# Patient Record
Sex: MALE | Race: OTHER | NOT HISPANIC OR LATINO | URBAN - METROPOLITAN AREA
[De-identification: names, ages, dates, MRNs, and addresses within clinical notes are randomized per-mention and may not be internally consistent; named-entity substitution may affect disease eponyms.]

---

## 2018-01-30 ENCOUNTER — OUTPATIENT (OUTPATIENT)
Dept: OUTPATIENT SERVICES | Facility: HOSPITAL | Age: 58
LOS: 1 days | Discharge: HOME | End: 2018-01-30

## 2018-01-30 DIAGNOSIS — I20.9 ANGINA PECTORIS, UNSPECIFIED: ICD-10-CM

## 2018-05-18 ENCOUNTER — OUTPATIENT (OUTPATIENT)
Dept: OUTPATIENT SERVICES | Facility: HOSPITAL | Age: 58
LOS: 1 days | Discharge: HOME | End: 2018-05-18

## 2018-05-18 DIAGNOSIS — M79.671 PAIN IN RIGHT FOOT: ICD-10-CM

## 2018-05-18 DIAGNOSIS — E11.49 TYPE 2 DIABETES MELLITUS WITH OTHER DIABETIC NEUROLOGICAL COMPLICATION: ICD-10-CM

## 2018-05-18 DIAGNOSIS — M79.672 PAIN IN LEFT FOOT: ICD-10-CM

## 2019-01-08 ENCOUNTER — TRANSCRIPTION ENCOUNTER (OUTPATIENT)
Age: 59
End: 2019-01-08

## 2019-01-08 ENCOUNTER — OUTPATIENT (OUTPATIENT)
Dept: OUTPATIENT SERVICES | Facility: HOSPITAL | Age: 59
LOS: 1 days | Discharge: HOME | End: 2019-01-08

## 2019-01-08 VITALS — RESPIRATION RATE: 19 BRPM | SYSTOLIC BLOOD PRESSURE: 136 MMHG | HEART RATE: 78 BPM | DIASTOLIC BLOOD PRESSURE: 86 MMHG

## 2019-01-08 VITALS
TEMPERATURE: 98 F | HEIGHT: 66 IN | SYSTOLIC BLOOD PRESSURE: 140 MMHG | RESPIRATION RATE: 18 BRPM | DIASTOLIC BLOOD PRESSURE: 91 MMHG | WEIGHT: 177.91 LBS | HEART RATE: 79 BPM

## 2019-01-08 RX ORDER — LOSARTAN POTASSIUM 100 MG/1
0 TABLET, FILM COATED ORAL
Qty: 0 | Refills: 0 | COMMUNITY

## 2019-01-08 RX ORDER — ASPIRIN/CALCIUM CARB/MAGNESIUM 324 MG
1 TABLET ORAL
Qty: 0 | Refills: 0 | COMMUNITY

## 2019-01-08 RX ORDER — METFORMIN HYDROCHLORIDE 850 MG/1
1000 TABLET ORAL
Qty: 0 | Refills: 0 | COMMUNITY

## 2019-01-08 RX ORDER — GLIMEPIRIDE 1 MG
1 TABLET ORAL
Qty: 0 | Refills: 0 | COMMUNITY

## 2019-01-08 RX ORDER — OMEGA-3 ACID ETHYL ESTERS 1 G
1000 CAPSULE ORAL
Qty: 0 | Refills: 0 | COMMUNITY

## 2019-01-08 NOTE — H&P PST ADULT - ASSESSMENT
57 yo M with PMH mentioned here for EGD and EUS for Esophageal submucosal Lesion  Risks and benefits discussed with the patient.  Will proceed with the scheduled case.

## 2019-01-08 NOTE — ASU DISCHARGE PLAN (ADULT/PEDIATRIC). - NOTIFY
Bleeding that does not stop/Inability to Tolerate Liquids or Foods/Persistent Nausea and Vomiting/Unable to Urinate/Pain not relieved by Medications

## 2019-01-08 NOTE — CHART NOTE - NSCHARTNOTEFT_GEN_A_CORE
PACU ANESTHESIA ADMISSION NOTE      Procedure:   Post op diagnosis:      ____  Intubated  TV:______       Rate: ______      FiO2: ______    _x___  Patent Airway    _x___  Full return of protective reflexes    _x___  Full recovery from anesthesia / back to baseline status    Vitals:            T:                BP :  142/69   R: 18  Sat:  100     P:100      Mental Status:  _x___ Awake   _____ Alert   _____ Drowsy   _____ Sedated    Nausea/Vomiting:  _x___  NO       ______Yes,   See Post - Op Orders         Pain Scale (0-10):  __0___    Treatment: _x___ None    ____ See Post - Op/PCA Orders    Post - Operative Fluids:   __x__ Oral   ____ See Post - Op Orders    Plan: Discharge:   _x___Home       _____Floor     _____Critical Care    _____  Other:_________________    Comments:  No anesthesia issues or complications noted.  Discharge when criteria met.

## 2019-01-08 NOTE — ASU DISCHARGE PLAN (ADULT/PEDIATRIC). - B. DRINK ALCOHOL, BEER, OR WINE
DIABETES PROGRESS REPORT-DIABETES SELF MANAGEMENT EDUCATION, INDIVIDUAL SESSION   Date: 12/27/2018     ASSESSMENT:    The patient was seen from 1300 to 1400.  The patient was seen for diabetes education in  an individual setting following a diagnosis of type 2 diabetes.     The barriers to self-care and learning limitations were assessed as: Cognitive , Psychological and / or emotional  and Financial limitations  The patient's preference for learning:  visual/verbal and observation, reading  and repetition  Readiness to learn: The patient demonstrates an ability to understand yes    Asking questions  yes     COMMENTS:   Eren was seen today for an initial individual diabetes self-management appointment.  Eren was alone at the visit today.  Eren was diagnosed with Type 2 diabetes in November 2018 following a CVA.  Prior to this Eren was in the pre-diabetes range for blood sugar levels.  Eren is on Metformin for his diabetes management.  Eren tests 1 time daily but has been testing 3 times daily for the past week as PCP wished to see more blood sugar levels through out the day.      During the visit today we reviewed the basic pathophysiology of diabetes.  We reviewed the role of metformin in reducing blood sugar readings along with its mechanism of action.  We reviewed target blood sugar levels for both fasting and 2 hours post prandial.  PCP would like Eren to have FBS levels of <120 mg/dL and 2 hour post prandial level of <140 mg/dL.  These parameters along with target A1C <7% were discussed.  We reviewed the different food groups along with the importance of eating 3 times daily, spaced 4-5 hours apart, at consistent times, without skipping meals and to have small snacks if needed in between main eating times.  Eren is already eating at consistent times per day with small snacks.   We reviewed the plate method for healthy eating an portion control.      After today’s session, patient will be able to:   Describe  what diabetes is and how it affects the body.   Verbalize his target glucose range.   Verbalize the understanding of the medications he/she is using to  manage diabetes.   Vebalizes understanding of food groups and plate method.    Patient verbalized no further questions at the end of appointment.      Current Diabetes Medications: Metformin 1000 mg BID    Date Fasting 2 Hr. Post Lunch 2 Hr. Post Supper 2 hr. Post Bedtime   12-27 109         12-26 111   115  134    12-25 101   116  119    12-24 111   97  96    12-23 113   97  119    12-22 110   94  109    12-21 103   93  113    12-20 129   94  101    12-19 110         12-18 109         12-17 115         12-16 109                       Patient did bring logbook/meter.    Hemoglobin A1C (%)   Date Value   11/27/2018 9.2 (H)     CHOLESTEROL (mg/dL)   Date Value   11/28/2018 194      HDL (mg/dL)   Date Value   11/28/2018 37 (L)    No components found for: LDL   TRIGLYCERIDE (mg/dL)   Date Value   11/28/2018 143      CALCULATED LDL (mg/dL)   Date Value   11/28/2018 128      Creatinine (mg/dL)   Date Value   11/27/2018 0.90          EDUCATION PROVIDED ON THE FOLLOWING DIABETES TOPICS:    The patient's identified learning needs include: diabetes disease process/treatment options, medical nutrition therapy, physical activity, medications, monitoring, acute complications, chronic complications, behavior change strategies and psychosocial adjustment  Today's visit included education/training on the following topics and with the noted assessed outcomes.     Diabetes disease process/overview and treatment options  Definition, types of diabetes, diagnostic criteria of diabetes: See comments/progress note  Causes, risk factors, symptoms of diabetes: See comments/progress note   Importance of diabetes control, ongoing education, and possible treatment changes/options: See comments/progress note    Monitoring   Blood glucose (purpose, testing times, care of meter/teststrips,  correct technique, keeping a record, lancet disposal, and alternative site testing - if applicable): See comments/progress note  Blood glucose targets: am fasting 80 mg/dL - 130 mg/dL or   2 - hour post pranadial glucose level 180mg/dL   Sharps disposal: See comments/progress note   Action for results outside target range: Needs review    A1c define, state goal, test schedule: See comments/progress note    Diabetes Medications  oral medications (dose, schedule, action, side effects) See comments/progress note   Medication adjustments/supplements, which may include meals, activity changes, travel, surgery: Needs review  Over the counter medications: Needs review    Physical Activity  Effects of physical activity on blood glucose levels, general health benefits, hypoglycemia prevention: Needs review   Reviewed ADA guidelines of 150 minutes per week of exercise. Needs review     Psychosocial Strategies  Effect of stress on blood glucose and healthy coping strategies: Needs review  Community resources and support systems: Needs review  Depression signs and symptoms, possible treatments and resources: Needs review    Nutrition    Provided basic carbohydrate information meal plan using plate method.  See Registered Dietitian progress note, Group 1, Group 2, Group 3 and Group 4 Sessions.  Needs review      Acute Complications (prevention,detection,treatment)  Hypoglycemia (risk, causes, signs, symptoms, treatment, prevention): Needs review   Hypoglycemia unawareness: Needs review  Problem solving: Including when to contact physician/diabetes care team: Needs review  Safe driving practices; Need for medical identification use: Needs review  Hyperglycemia (risk, causes, signs, symptoms, treatment, prevention):Needs review  Sick day (effect of illness on blood glucose and guidelines for sick day self - care:Needs review     Chronic Complications (prevention,detection,treatment)  Risk reduction strategies for prevention and  treatment of nephropathy, retinopathy, neuropathy, frequent infections and cardiovascular disease:Needs review  Essential Care Guidelines: Tests (A1c, lipids,albumin/creatinine , diabetes focused visits every 3 - 6 months, dilated eye exam, dental visits and proper oral health care, annual comprehensive lower extremity exam, daily foot care and sign and symptoms of problems/infection, skin care and hygiene, and immunizations (flu, pneumonia, hepatitis B), general health benefits, hypoglycemia prevention:Needs review      Strategies to Promote Health/Behavioral Change   Problem solving strategies:Needs review  Behavioral change/Self - care goals: Needs review    Written materials provided were:  Individual Session content which includes:  First Steps to Managing Your Diabetes at Home (FYWB)  A1c A Test to Measure Blood Sugar Control (FYWB)  Blood Sugar Targets (FYWB)  Diabetes-Healthy Living Begins Here(FYWB)  Carbohydrates & Effects on Blood Glucose (Nutrition Services)    PLAN:    Goal Setting to Promote Health & Problem Solving for Daily Living was discussed.  Your patient’s individualized goals include:   Will continue to watch portion sizes Began Will review at Group Session 1     Began Will review at Group Session 1     Began Will review at Group Session 1     Began Will review at Group Session 1     Diabetes Self-Assessment    Do you like help filling out, or reading health forms?  yes  and no    Learning Style  When learning something new, do you prefer: (check all that apply)   a.  Diagrams, charts or graphs?  yes    b.  Handouts, books or articles?  yes    c.  Question and answer, talk, group discussion or guest speakers?   yes    d.  Demonstrations, models or practice sessions?  yes     Health History  In what year were you diagnosed with diabetes?  Year: 2018  Do you know what type of diabetes you have?  yes   Have you had diabetes education before? Yes; prediabetes education  Do you feel good about your  Statement Selected general health?  no    Does any of your family have diabetes? yes     Woman's Health  Are you planning a pregnancy?  no  Have you ever had gestational diabetes or a baby weighing 9 lbs. or more at birth?  no    Nutrition/Meal Planning  Do you eat three meals a day?  yes    Do you eat at about the same time each day?  yes   Do you drink regular soda or fruit drinks (orange juice, fruit punch)?  yes   Do you \"eat out\" or get \"carry out\" more than once per week?  no   Do you do your own cooking?  yes    Do you do your own food shopping?  yes    Do have diet limitations? (Examples, low salt, no dairy products, no wheat products)  yes   Within the past 12 months, have you worried whether food would run out before you got money to buy more? yes    Do you drink alcohol?  yes      Drinks per week:  0     Activity  Do you exercise at least 30 minutes/3 or more times per week?  no  Is there a medical reason for limiting activity?  yes   What type of activity do you do?  walking    Monitoring  Do you test your blood sugars at home?  yes   If yes, 1 times per day.  Do you carry a form of fast-acting sugar with you?  no  Do you have a sharps container?  yes     Medication  Do you know what diabetes medication(s) you take?  yes   Do you miss or forget to take your pills or insulin?  no  Do you carry or wear diabetes identification?  no    Complications/Problems  Do you have numbness, tingling, sores, or pain in your hands or feet?  no  Does your doctor check your feet? yes    Do you check your feet for any signs of problems?  no  Do you have frequent infections or wounds that are slow to heal?  no  Did you have a dilated eye exam in the past year?  no     Do you have any kidney problems? no   Have you seen a dentist in the past year?  no  Have you had a change or loss in hearing?  no  Do you sleep 6-8 hours per night?  yes   Do you have sleep apnea?  yes   Do you have any sexual problems?  no    Socioeconomic  Do you live alone?   yes   Do you feel safe in relationships at home? na  Do money concerns keep you from:  · Taking your diabetes medication(s) as prescribed? no  · Attending medical appointments? yes   Do you have cultural or [employer] practices or beliefs that influence how you care for your diabetes?  no  Are you currently working?  yes     If yes, inside and/or outside the home.  Do you work second or third shift?  yes   Do you use computers to look up health and other information?  yes   Over the last 2 weeks, have you been bothered by feeling loss of interest in doing things?    Several days  Over the last 2 weeks, have you felt down, depressed or hopeless?  Several days  To what Degree, during the past month, have the items below distressed or bothered you?  · Feeling overwhelmed by the demands of living with diabetes:   Not a problem     A moderate problem A serious problem  · Feeling that I am often failing with my diabets routine:   Not a problem     A moderate problem    A serious problem    Diabetes Education/Which Topics Would You Like to Learn About?  Planning meals, Monitoring/Blood sugar targets, Healthy benefits of physical activity, Preventing complications, Foot care, Setting healthy goals and Diabetes support groups/community resources    Completion of Diabetes Self-Management Education and Support Program.  [place] scheduled for Group Session 1 - will call to schedule.    Please contact us with any questions/concerns:    Kimberli Huynh RN, CDE  Diabetes Education   [facility]  PCP order located in the patient's EHR.

## 2019-01-09 LAB — SURGICAL PATHOLOGY STUDY: SIGNIFICANT CHANGE UP

## 2019-01-12 DIAGNOSIS — K22.9 DISEASE OF ESOPHAGUS, UNSPECIFIED: ICD-10-CM

## 2019-01-12 DIAGNOSIS — K20.8 OTHER ESOPHAGITIS: ICD-10-CM

## 2019-01-29 LAB
CEA FLD-MCNC: SIGNIFICANT CHANGE UP
SPECIMEN SOURCE FLD: SIGNIFICANT CHANGE UP

## 2022-04-23 ENCOUNTER — EMERGENCY (EMERGENCY)
Facility: HOSPITAL | Age: 62
LOS: 0 days | Discharge: HOME | End: 2022-04-23
Attending: EMERGENCY MEDICINE | Admitting: EMERGENCY MEDICINE
Payer: COMMERCIAL

## 2022-04-23 VITALS
HEIGHT: 66 IN | RESPIRATION RATE: 16 BRPM | DIASTOLIC BLOOD PRESSURE: 75 MMHG | HEART RATE: 96 BPM | WEIGHT: 169.98 LBS | OXYGEN SATURATION: 98 % | TEMPERATURE: 99 F | SYSTOLIC BLOOD PRESSURE: 127 MMHG

## 2022-04-23 DIAGNOSIS — K08.89 OTHER SPECIFIED DISORDERS OF TEETH AND SUPPORTING STRUCTURES: ICD-10-CM

## 2022-04-23 DIAGNOSIS — E11.9 TYPE 2 DIABETES MELLITUS WITHOUT COMPLICATIONS: ICD-10-CM

## 2022-04-23 DIAGNOSIS — Z79.82 LONG TERM (CURRENT) USE OF ASPIRIN: ICD-10-CM

## 2022-04-23 DIAGNOSIS — I10 ESSENTIAL (PRIMARY) HYPERTENSION: ICD-10-CM

## 2022-04-23 DIAGNOSIS — Z79.84 LONG TERM (CURRENT) USE OF ORAL HYPOGLYCEMIC DRUGS: ICD-10-CM

## 2022-04-23 PROCEDURE — 99284 EMERGENCY DEPT VISIT MOD MDM: CPT

## 2022-04-23 NOTE — ED PROVIDER NOTE - OBJECTIVE STATEMENT
pt with pmhx HTN and DM presents to ED c/o L lower dental pain for a few days. reports had a procedure done in prep for root canal and subsequently felt worse pain. he returned to dentist the next day and was given abx and pain meds, but still having discomfort. pain is sharp, nonradiating, moderate. denies exacerbating or relieving factors. Denies fever/chill/HA/dizziness/chest pain/palpitation/sob/abd pain/n/v/d/ black stool/bloody stool/urinary sxs

## 2022-04-23 NOTE — ED PROVIDER NOTE - PATIENT PORTAL LINK FT
You can access the FollowMyHealth Patient Portal offered by [facility] by registering at the following website: [email or web address]. By joining [facility]’s FollowMyHealth portal, you will also be able to view your health information using other applications (apps) compatible with our system.

## 2022-04-23 NOTE — ED PROVIDER NOTE - ATTENDING APP SHARED VISIT CONTRIBUTION OF CARE
Patient eloped before my formal evaluation. I reviewed the Resident’s or Physician Assistant’s note (as assigned above), and agree with the findings and plan except as documented in my note.    61 male here for dental pain after recent procedure as outpatient. No constitutional symptoms but admits to persistent pain despite Rx.     ROS otherwise unremarkable    PE: male in no distress. CV: pulses intact. CHEST: normal work of breathing. ABD: nondistended. SKIN: normal. EXT: FROM. NEURO: AAO 3 no focal deficits. HEENT: mucosa normal no facial swelling. dental exam as per dental note

## 2022-04-23 NOTE — ED PROVIDER NOTE - PHYSICAL EXAMINATION
CONSTITUTIONAL: Well-appearing; well-nourished; in no apparent distress.   ENT: normal nose; no rhinorrhea; normal pharynx with no tonsillar hypertrophy.   NECK: Supple; non-tender; no cervical lymphadenopathy.   CARDIOVASCULAR: Normal S1, S2; no murmurs, rubs, or gallops.   RESPIRATORY: Normal chest excursion with respiration; breath sounds clear and equal bilaterally; no wheezes, rhonchi, or rales.  MS: No evidence of trauma or deformity. Normal ROM in all four extremities; non-tender to palpation; distal pulses are normal.   SKIN: Normal for age and race; warm; dry; good turgor; no apparent lesions or exudate.   NEURO/PSYCH: A & O x 4; grossly unremarkable. mood and manner are appropriate.

## 2022-04-23 NOTE — CONSULT NOTE ADULT - SUBJECTIVE AND OBJECTIVE BOX
Patient is a 61y old  Male who presents with a chief complaint of post-op dental complications.     HPI: pt with pmhx HTN and DM presents to ED c/o L lower dental pain for a few days. reports had a procedure done in prep for root canal and subsequently felt worse pain. he returned to dentist the next day and was given abx and pain meds, but still having discomfort. pain is sharp, nonradiating, moderate. denies exacerbating or relieving factors. Denies fever/chill/HA/dizziness/chest pain/palpitation/sob/abd pain/n/v/d/ black stool/bloody stool/urinary sxs      PAST MEDICAL & SURGICAL HISTORY:  Diabetes mellitus    Hypertension  (   ) heart valve replacement  (   ) joint replacement    Allergies  - No Known Allergies    *Last Dental Visit: within the week     Vital Signs Last 24 Hrs  T(C): 37 (23 Apr 2022 16:15), Max: 37 (23 Apr 2022 16:15)  T(F): 98.6 (23 Apr 2022 16:15), Max: 98.6 (23 Apr 2022 16:15)  HR: 96 (23 Apr 2022 16:15) (96 - 96)  BP: 127/75 (23 Apr 2022 16:15) (127/75 - 127/75)  BP(mean): --  RR: 16 (23 Apr 2022 16:15) (16 - 16)  SpO2: 98% (23 Apr 2022 16:15) (98% - 98%)      EOE:  TMJ ( - ) clicks                     ( - ) pops                     ( - ) crepitus             Mandible <<FROM>>             Facial bones and MOM <<grossly intact>>             ( - ) trismus             ( - ) lymphadenopathy             ( - ) swelling             ( - ) asymmetry             ( - ) palpation             ( - ) dyspnea             ( - ) dysphagia             ( - ) loss of consciousness    IOE:  <<permanent>> dentition: <<grossly intact>>            hard/soft palate:  ( - ) palatal torus, <<No pathology noted>>           tongue/FOM <<No pathology noted>>           labial/buccal mucosa <<No pathology noted>>           ( - ) percussion           ( + ) palpation           ( + ) swelling            ( - ) abscess           ( - ) sinus tract    *ASSESSMENT: 61y old male patient has post-op complications after having dental procedure which involved localized anesthesia. Patient has is tender to palpation in the buccal vestibule of #20 where patient reports root canal treatment was initiated. There is no abscess or fistula noted in the area. The symptoms may be due to severity of infection and/or due to the local anesthesia patient received via inferior alveolar nerve block.     *PLAN: Patient currently taking Cephalexin and recommended to complete course. Patient already has follow up appointment with primary dentist on 4/27.       RECOMMENDATIONS:  1) << Complete current antibiotics  >>  2) Dental F/U with outpatient dentist for comprehensive dental care.   3) If any difficulty swallowing/breathing, fever occur, return to ER.     Carrington Danielle, #[id number]

## 2022-04-23 NOTE — ED ADULT NURSE NOTE - OBJECTIVE STATEMENT
Pt. had dental cleaning and fillings on Wednesday and went back Thursday for c/o severe pain of left lower tooth. Took XRs which showed infection. Gave abx and pain medication. C/o left face and lips numbness,

## 2022-04-23 NOTE — ED ADULT TRIAGE NOTE - CHIEF COMPLAINT QUOTE
Implemented All Universal Safety Interventions:  [place] to call system. Call bell, personal items and telephone within reach. Instruct patient to call for assistance. Room bathroom lighting operational. Non-slip footwear when patient is off stretcher. Physically safe environment: no spills, clutter or unnecessary equipment. Stretcher in lowest position, wheels locked, appropriate side rails in place.
Pt. had dental cleaning and fillings on Wednesday and went back Thursday for c/o severe pain of left lower tooth. Took XRs which showed infection. Gave abx and pain medication. C/o left face and lips numbness, chills, and dizziness since Thursday.
(1) dionna

## 2022-04-23 NOTE — ED PROVIDER NOTE - CLINICAL SUMMARY MEDICAL DECISION MAKING FREE TEXT BOX
61 male here for dental pain. Had screening exam, supportive care, medications and reevaluation, no acute emergent findings, consult to dental done, plan discussed with patient, will discharge with outpatient management and return and follow up instructions.

## 2022-04-23 NOTE — ED PROVIDER NOTE - NS ED ATTENDING STATEMENT MOD
This was a shared visit with the BHARAT. I reviewed and verified the documentation and independently performed the documented:

## 2022-04-23 NOTE — ED ADULT NURSE NOTE - CHIEF COMPLAINT QUOTE
Pt. had dental cleaning and fillings on Wednesday and went back Thursday for c/o severe pain of left lower tooth. Took XRs which showed infection. Gave abx and pain medication. C/o left face and lips numbness, chills, and dizziness since Thursday.

## 2022-04-24 PROBLEM — I10 ESSENTIAL (PRIMARY) HYPERTENSION: Chronic | Status: ACTIVE | Noted: 2019-01-08

## 2022-04-24 PROBLEM — E11.9 TYPE 2 DIABETES MELLITUS WITHOUT COMPLICATIONS: Chronic | Status: ACTIVE | Noted: 2019-01-08

## 2023-01-01 ENCOUNTER — EMERGENCY (EMERGENCY)
Facility: HOSPITAL | Age: 63
LOS: 0 days | Discharge: HOME | End: 2023-01-02
Attending: EMERGENCY MEDICINE | Admitting: EMERGENCY MEDICINE
Payer: COMMERCIAL

## 2023-01-01 VITALS
HEART RATE: 99 BPM | OXYGEN SATURATION: 98 % | SYSTOLIC BLOOD PRESSURE: 152 MMHG | WEIGHT: 171.96 LBS | RESPIRATION RATE: 18 BRPM | DIASTOLIC BLOOD PRESSURE: 85 MMHG | TEMPERATURE: 98 F
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
## 2023-01-01 DIAGNOSIS — E78.5 HYPERLIPIDEMIA, UNSPECIFIED: ICD-10-CM

## 2023-01-01 DIAGNOSIS — S09.90XA UNSPECIFIED INJURY OF HEAD, INITIAL ENCOUNTER: ICD-10-CM

## 2023-01-01 DIAGNOSIS — Z86.59 PERSONAL HISTORY OF OTHER MENTAL AND BEHAVIORAL DISORDERS: ICD-10-CM
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
## 2023-01-01 DIAGNOSIS — I10 ESSENTIAL (PRIMARY) HYPERTENSION: ICD-10-CM

## 2023-01-01 DIAGNOSIS — Z23 ENCOUNTER FOR IMMUNIZATION: ICD-10-CM

## 2023-01-01 DIAGNOSIS — Z20.822 CONTACT WITH AND (SUSPECTED) EXPOSURE TO COVID-19: ICD-10-CM

## 2023-01-01 DIAGNOSIS — S00.01XA ABRASION OF SCALP, INITIAL ENCOUNTER: ICD-10-CM

## 2023-01-01 DIAGNOSIS — F10.929 ALCOHOL USE, UNSPECIFIED WITH INTOXICATION, UNSPECIFIED: ICD-10-CM

## 2023-01-01 DIAGNOSIS — E11.9 TYPE 2 DIABETES MELLITUS WITHOUT COMPLICATIONS: ICD-10-CM

## 2023-01-01 PROCEDURE — 99285 EMERGENCY DEPT VISIT HI MDM: CPT

## 2023-01-01 NOTE — ED PROVIDER NOTE - NSFOLLOWUPCLINICS_GEN_ALL_ED_FT
[facility]  Detox [facility]mt  [address] [place] [address]  Phone: [phone]  Fax:   Follow Up Time: 1-3 Days

## 2023-01-01 NOTE — ED PROVIDER NOTE - PHYSICAL EXAMINATION
_  CONSTITUTIONAL: ABC intact, GCS 15, NAD  HEAD: NCAT, no signs of basilar skull fx, no depressions  EENT: EOMI, PERRL b/l, no epistaxis, MMM  NECK: C-collar in place, no midline C-spine tenderness/step-offs/deformities, no anterior swelling  CV: RRR, equal distal pulses  RESP: Normal work of breathing, symmetric rise and fall of chest  ABD: Soft, NT, no R/G  EXT: No obvious deformity, no tenderness of extremities, cap refill < 2 seconds  CHEST: No clavicular/chest wall tenderness/deformity/tenting/crepitus  BACK: No midline T/L/S-spine tenderness/step-offs/deformities  PELVIS: No pelvic instability, no blood at urethral meatus  RECTAL: Normal sphincter tone, no blood in rectum  SKIN: No lacerations, no abrasions  NEURO: AAOx3, no FND (motor strength and sensation grossly intact throughout)  PSYCH: Cooperative, appropriate affect

## 2023-01-01 NOTE — ED PROVIDER NOTE - ATTENDING CONTRIBUTION TO CARE
62-year-old male past medical history of hypertension, hyperlipidemia, diabetes presents after a fall.  As per family is bedside patient had a lot to drink tonight.  He was seated at a chair on the top of the stairs.  Patient then vomited while seated in the chair and subsequently fell down flight of stairs while seated in the chair.  Family reports that the chair broke.  Positive head trauma.  Unknown LOC.  Family came right away.  No anticoagulation.  She denies any pain or injuries at this time.     CONSTITUTIONAL: Well-developed; well-nourished; in no acute distress.   SKIN: warm, dry  HEAD: Normocephalic; atraumatic.  EYES: PERRL, EOMI, no conjunctival erythema  ENT: No nasal discharge; airway clear.  NECK: Supple; non tender.  CARD: S1, S2 normal;  Regular rate and rhythm.   RESP: No wheezes, rales or rhonchi.  ABD: soft non tender, non distended, no rebound or guarding  EXT: Normal ROM.  5/5 strength in all 4 extremities no midline spinal tenderness moving all extremities  LYMPH: No acute cervical adenopathy.  NEURO: Alert, oriented, grossly unremarkable. neurovascularly intact  PSYCH: Cooperative, appropriate.

## 2023-01-01 NOTE — ED PROVIDER NOTE - PATIENT PORTAL LINK FT
You can access the FollowMyHealth Patient Portal offered by [facility] by registering at the following website: [email or web address]. By joining [facility]’s FollowMyHealth portal, you will also be able to view your health information using other applications (apps) compatible with our system.

## 2023-01-01 NOTE — ED PROVIDER NOTE - NSFOLLOWUPINSTRUCTIONS_ED_ALL_ED_FT
Your visit in the emergency department today did not reveal anything immediately life-threatening.    However, it is important that you follow-up with your PRIMARY CARE PHYSICIAN within 3-5 days.  If you do not have a primary care physician, please call your health insurance to select one.  If you do not have health insurance, please schedule an appointment with the [facility]: [phone], located at [address] [place] [address].    Additionally, it is important that you follow-up with SPECIALIST. If you are unable to schedule a visit(s) with the provided specialist(s), please speak with your primary care doctor for guidance to such a specialist.    ---------------------------------------------------------------------------------------------------    Falls    Your risk of falling increases as you grow older. That's because getting older can make it harder to walk steadily and keep your balance. Also, the effects of falls are more serious in older people. If you have fallen in the past, you are at higher risk of falling again.    Several things can increase your risk of a fall, including:  - Illness(es)  - A change in the medicines you take  - An unsafe or unfamiliar setting (for example, a room with rugs or furniture that might trip you, or an area you don't know well)    Is there anything I can do on my own?    Yes. To help keep from falling, you can:    - Make your home safer:   To avoid falling at home, get rid of things that might make you trip or slip. This might include furniture, electrical cords, clutter, and loose rugs. Keep your home well-lit so that you can easily see where you are going. Avoid storing things in high places so you don't have to reach or climb.    - Wear sturdy shoes that fit well:  Walking around in bare feet, or only socks, can also increase your risk of falling.    - Use a cane, walker, and other safety devices: If your doctor recommends that you use a cane or walker, be sure that it's the right size and you know how to use it. There are other devices that might help you avoid falling, too. These include grab bars or a sturdy seat for the shower, non-slip bath mats, and hand rails or treads for the stairs (to prevent slipping).    If you worry that you could fall, there are also alarm buttons that let you call for help if you fall and can't get up.    What should I do if I fall?  If you fall, see your doctor right away, even if you aren't hurt. Your doctor can try to figure out what caused you to fall, and how likely you are to fall again. He or she will do an exam and talk to you about your health problems, medicines, and activities. Then he or she can suggest things you can do to avoid falling again.    Many older people have a hard time recovering after a fall. Doing things to prevent falling can help you to protect your health and independence.    ---------------------------------------------------------------------------------------------------    Nausea / Vomiting    Nausea is the feeling that you have to vomit. As nausea gets worse, it can lead to vomiting. Vomiting puts you at an increased risk for dehydration. Older adults and people with other diseases or a weak immune system are at higher risk for dehydration. Drink clear fluids in small but frequent amounts as tolerated. Eat bland, easy-to-digest foods in small amounts as tolerated.    SEEK IMMEDIATE MEDICAL CARE IF YOU HAVE ANY OF THE FOLLOWING SYMPTOMS: fever, inability to keep sufficient fluids down, black or bloody vomitus, black or bloody stools, lightheadedness/dizziness, chest pain, severe headache, rash, shortness of breath, cold or clammy skin, confusion, pain with urination, or any signs of dehydration. Your visit in the emergency department today did not reveal anything immediately life-threatening.    However, it is important that you follow-up with your PRIMARY CARE PHYSICIAN within 3-5 days.  If you do not have a primary care physician, please call your health insurance to select one.  If you do not have health insurance, please schedule an appointment with the [facility]: [phone], located at [address] [place] [address].    ---------------------------------------------------------------------------------------------------    Falls    Your risk of falling increases as you grow older. That's because getting older can make it harder to walk steadily and keep your balance. Also, the effects of falls are more serious in older people. If you have fallen in the past, you are at higher risk of falling again.    Several things can increase your risk of a fall, including:  - Illness(es)  - A change in the medicines you take  - An unsafe or unfamiliar setting (for example, a room with rugs or furniture that might trip you, or an area you don't know well)    Is there anything I can do on my own?    Yes. To help keep from falling, you can:    - Make your home safer:   To avoid falling at home, get rid of things that might make you trip or slip. This might include furniture, electrical cords, clutter, and loose rugs. Keep your home well-lit so that you can easily see where you are going. Avoid storing things in high places so you don't have to reach or climb.    - Wear sturdy shoes that fit well:  Walking around in bare feet, or only socks, can also increase your risk of falling.    - Use a cane, walker, and other safety devices: If your doctor recommends that you use a cane or walker, be sure that it's the right size and you know how to use it. There are other devices that might help you avoid falling, too. These include grab bars or a sturdy seat for the shower, non-slip bath mats, and hand rails or treads for the stairs (to prevent slipping).    If you worry that you could fall, there are also alarm buttons that let you call for help if you fall and can't get up.    What should I do if I fall?  If you fall, see your doctor right away, even if you aren't hurt. Your doctor can try to figure out what caused you to fall, and how likely you are to fall again. He or she will do an exam and talk to you about your health problems, medicines, and activities. Then he or she can suggest things you can do to avoid falling again.    Many older people have a hard time recovering after a fall. Doing things to prevent falling can help you to protect your health and independence.    ---------------------------------------------------------------------------------------------------    Nausea / Vomiting    Nausea is the feeling that you have to vomit. As nausea gets worse, it can lead to vomiting. Vomiting puts you at an increased risk for dehydration. Older adults and people with other diseases or a weak immune system are at higher risk for dehydration. Drink clear fluids in small but frequent amounts as tolerated. Eat bland, easy-to-digest foods in small amounts as tolerated.    SEEK IMMEDIATE MEDICAL CARE IF YOU HAVE ANY OF THE FOLLOWING SYMPTOMS: fever, inability to keep sufficient fluids down, black or bloody vomitus, black or bloody stools, lightheadedness/dizziness, chest pain, severe headache, rash, shortness of breath, cold or clammy skin, confusion, pain with urination, or any signs of dehydration. Your visit in the emergency department today did not reveal anything immediately life-threatening.    However, it is important that you follow-up with your PRIMARY CARE PHYSICIAN within 3-5 days.  If you do not have a primary care physician, please call your health insurance to select one.  If you do not have health insurance, please schedule an appointment with the [facility]: [phone], located at [address] [place] [address].    ---------------------------------------------------------------------------------------------------    Falls    Your risk of falling increases as you grow older. That's because getting older can make it harder to walk steadily and keep your balance. Also, the effects of falls are more serious in older people. If you have fallen in the past, you are at higher risk of falling again.    Several things can increase your risk of a fall, including:  - Illness(es)  - A change in the medicines you take  - An unsafe or unfamiliar setting (for example, a room with rugs or furniture that might trip you, or an area you don't know well)    Is there anything I can do on my own?    Yes. To help keep from falling, you can:    - Make your home safer:   To avoid falling at home, get rid of things that might make you trip or slip. This might include furniture, electrical cords, clutter, and loose rugs. Keep your home well-lit so that you can easily see where you are going. Avoid storing things in high places so you don't have to reach or climb.    - Wear sturdy shoes that fit well:  Walking around in bare feet, or only socks, can also increase your risk of falling.    - Use a cane, walker, and other safety devices: If your doctor recommends that you use a cane or walker, be sure that it's the right size and you know how to use it. There are other devices that might help you avoid falling, too. These include grab bars or a sturdy seat for the shower, non-slip bath mats, and hand rails or treads for the stairs (to prevent slipping).    If you worry that you could fall, there are also alarm buttons that let you call for help if you fall and can't get up.    What should I do if I fall?  If you fall, see your doctor right away, even if you aren't hurt. Your doctor can try to figure out what caused you to fall, and how likely you are to fall again. He or she will do an exam and talk to you about your health problems, medicines, and activities. Then he or she can suggest things you can do to avoid falling again.    Many older people have a hard time recovering after a fall. Doing things to prevent falling can help you to protect your health and independence.    ---------------------------------------------------------------------------------------------------    Nausea / Vomiting    Nausea is the feeling that you have to vomit. As nausea gets worse, it can lead to vomiting. Vomiting puts you at an increased risk for dehydration. Older adults and people with other diseases or a weak immune system are at higher risk for dehydration. Drink clear fluids in small but frequent amounts as tolerated. Eat bland, easy-to-digest foods in small amounts as tolerated.    SEEK IMMEDIATE MEDICAL CARE IF YOU HAVE ANY OF THE FOLLOWING SYMPTOMS: fever, inability to keep sufficient fluids down, black or bloody vomitus, black or bloody stools, lightheadedness/dizziness, chest pain, severe headache, rash, shortness of breath, cold or clammy skin, confusion, pain with urination, or any signs of dehydration.        Alcohol Abuse    Alcohol intoxication occurs when the amount of alcohol that a person has consumed impairs his or her ability to mentally and physically function. Chronic alcohol consumption can also lead to a variety of health issues including neurological disease, stomach disease, heart disease, liver disease, etc. Do not drive after drinking alcohol. Drinking enough alcohol to end up in an Emergency Room suggests you may have an alcohol abuse problem. Seek help at a drug addiction center.    SEEK IMMEDIATE MEDICAL CARE IF YOU HAVE ANY OF THE FOLLOWING SYMPTOMS: seizures, vomiting blood, blood in your stool, lightheadedness/dizziness, or becoming shaky to tremulous when you stop drinking.

## 2023-01-01 NOTE — ED PROVIDER NOTE - OBJECTIVE STATEMENT
Patient is a 62-year-old male with a history of diabetes, hypertension, hyperlipidemia, heavy alcohol use, presents for fall tonight. +HT, -AC, ?LOC.  The fall was unwitnessed, but daughter at bedside heard the fall in real-time.  She saw vomit at the top of the steps, with foot skid marks, a broken chair snf down the stairs, and the patient at the bottom.  She notes that she was sitting in the chair  near the top of the stairs earlier.  She suspects that he slipped on his socks causing him to fall down the steps in the chair.  He has an abrasion to the right posterior head.  Patient cannot recall the event, but is also heavily intoxicated. Denies any pain or other complaints.

## 2023-01-01 NOTE — ED PROVIDER NOTE - PROGRESS NOTE DETAILS
Authored by Dr. Paz: pt feeling better, answers questions appropriately and stable for d/c, lactate improved ADDENDUM by Kana Bergman M.D.: I received sign-off from Dr. SWAPNIL Cole. 62-year-old male with history of hypertension, hyperlipidemia, diabetes, presents with fall out of his chair while intoxicated, noted elevated lactate, I was to follow-up repeat lactate and discharge if downtrending. On my assessment, patient confirms he had been drinking. States he feels all better now and has no concerns or questions and would like to go home. Denies history of seizures. On exam, NAD, well-appearing, laying comfortably in bed, no WOB, head NCAT, noted mild anterior tongue abrasion, RRR, abdomen soft/tender/nondistended, no extremity TTP/step-off/deformity no tremors or tongue fasciculations. Imaging unremarkable. Lactate down trended. Character low suspicion for seizure. No evidence of intoxication or withdrawal. Patient is well appearing, NAD, afebrile, hemodynamically stable. Any available tests and studies were discussed with patient. Discharged with instructions in further symptomatic care, return precautions, and need for PMD f/u.

## 2023-01-02 VITALS
HEART RATE: 88 BPM | SYSTOLIC BLOOD PRESSURE: 153 MMHG | RESPIRATION RATE: 18 BRPM | TEMPERATURE: 98 F | OXYGEN SATURATION: 96 % | DIASTOLIC BLOOD PRESSURE: 88 MMHG

## 2023-01-02 LAB
ALBUMIN SERPL ELPH-MCNC: 4.9 G/DL — SIGNIFICANT CHANGE UP (ref 3.5–5.2)
ALP SERPL-CCNC: 57 U/L — SIGNIFICANT CHANGE UP (ref 30–115)
ALT FLD-CCNC: 32 U/L — SIGNIFICANT CHANGE UP (ref 0–41)
ANION GAP SERPL CALC-SCNC: 18 MMOL/L — HIGH (ref 7–14)
APTT BLD: 29.6 SEC — SIGNIFICANT CHANGE UP (ref 27–39.2)
AST SERPL-CCNC: 31 U/L — SIGNIFICANT CHANGE UP (ref 0–41)
BASOPHILS # BLD AUTO: 0.07 K/UL — SIGNIFICANT CHANGE UP (ref 0–0.2)
BASOPHILS NFR BLD AUTO: 0.9 % — SIGNIFICANT CHANGE UP (ref 0–1)
BILIRUB SERPL-MCNC: 0.3 MG/DL — SIGNIFICANT CHANGE UP (ref 0.2–1.2)
BUN SERPL-MCNC: 13 MG/DL — SIGNIFICANT CHANGE UP (ref 10–20)
CALCIUM SERPL-MCNC: 9.4 MG/DL — SIGNIFICANT CHANGE UP (ref 8.4–10.5)
CHLORIDE SERPL-SCNC: 93 MMOL/L — LOW (ref 98–110)
CK MB CFR SERPL CALC: 7.1 NG/ML — HIGH (ref 0.6–6.3)
CO2 SERPL-SCNC: 23 MMOL/L — SIGNIFICANT CHANGE UP (ref 17–32)
CREAT SERPL-MCNC: 0.8 MG/DL — SIGNIFICANT CHANGE UP (ref 0.7–1.5)
EGFR: 99 ML/MIN/1.73M2 — SIGNIFICANT CHANGE UP
EOSINOPHIL # BLD AUTO: 0.6 K/UL — SIGNIFICANT CHANGE UP (ref 0–0.7)
EOSINOPHIL NFR BLD AUTO: 7.8 % — SIGNIFICANT CHANGE UP (ref 0–8)
ETHANOL SERPL-MCNC: 226 MG/DL — HIGH
GLUCOSE SERPL-MCNC: 310 MG/DL — HIGH (ref 70–99)
HCT VFR BLD CALC: 40.5 % — LOW (ref 42–52)
HGB BLD-MCNC: 13.9 G/DL — LOW (ref 14–18)
IMM GRANULOCYTES NFR BLD AUTO: 0.6 % — HIGH (ref 0.1–0.3)
INR BLD: 0.94 RATIO — SIGNIFICANT CHANGE UP (ref 0.65–1.3)
LACTATE SERPL-SCNC: 3.2 MMOL/L — HIGH (ref 0.7–2)
LACTATE SERPL-SCNC: 3.3 MMOL/L — HIGH (ref 0.7–2)
LACTATE SERPL-SCNC: 4 MMOL/L — CRITICAL HIGH (ref 0.7–2)
LIDOCAIN IGE QN: 192 U/L — HIGH (ref 7–60)
LYMPHOCYTES # BLD AUTO: 3.7 K/UL — HIGH (ref 1.2–3.4)
LYMPHOCYTES # BLD AUTO: 47.9 % — SIGNIFICANT CHANGE UP (ref 20.5–51.1)
MAGNESIUM SERPL-MCNC: 2.1 MG/DL — SIGNIFICANT CHANGE UP (ref 1.8–2.4)
MCHC RBC-ENTMCNC: 30.5 PG — SIGNIFICANT CHANGE UP (ref 27–31)
MCHC RBC-ENTMCNC: 34.3 G/DL — SIGNIFICANT CHANGE UP (ref 32–37)
MCV RBC AUTO: 89 FL — SIGNIFICANT CHANGE UP (ref 80–94)
MONOCYTES # BLD AUTO: 0.8 K/UL — HIGH (ref 0.1–0.6)
MONOCYTES NFR BLD AUTO: 10.4 % — HIGH (ref 1.7–9.3)
NEUTROPHILS # BLD AUTO: 2.5 K/UL — SIGNIFICANT CHANGE UP (ref 1.4–6.5)
NEUTROPHILS NFR BLD AUTO: 32.4 % — LOW (ref 42.2–75.2)
NRBC # BLD: 0 /100 WBCS — SIGNIFICANT CHANGE UP (ref 0–0)
NT-PROBNP SERPL-SCNC: 12 PG/ML — SIGNIFICANT CHANGE UP (ref 0–300)
PLATELET # BLD AUTO: 153 K/UL — SIGNIFICANT CHANGE UP (ref 130–400)
POTASSIUM SERPL-MCNC: 4.1 MMOL/L — SIGNIFICANT CHANGE UP (ref 3.5–5)
POTASSIUM SERPL-SCNC: 4.1 MMOL/L — SIGNIFICANT CHANGE UP (ref 3.5–5)
PROT SERPL-MCNC: 7.4 G/DL — SIGNIFICANT CHANGE UP (ref 6–8)
PROTHROM AB SERPL-ACNC: 10.7 SEC — SIGNIFICANT CHANGE UP (ref 9.95–12.87)
RBC # BLD: 4.55 M/UL — LOW (ref 4.7–6.1)
RBC # FLD: 12.4 % — SIGNIFICANT CHANGE UP (ref 11.5–14.5)
SARS-COV-2 RNA SPEC QL NAA+PROBE: SIGNIFICANT CHANGE UP
SODIUM SERPL-SCNC: 134 MMOL/L — LOW (ref 135–146)
TROPONIN T SERPL-MCNC: <0.01 NG/ML — SIGNIFICANT CHANGE UP
WBC # BLD: 7.72 K/UL — SIGNIFICANT CHANGE UP (ref 4.8–10.8)
WBC # FLD AUTO: 7.72 K/UL — SIGNIFICANT CHANGE UP (ref 4.8–10.8)

## 2023-01-02 PROCEDURE — 71045 X-RAY EXAM CHEST 1 VIEW: CPT | Mod: 26

## 2023-01-02 PROCEDURE — 72170 X-RAY EXAM OF PELVIS: CPT | Mod: 26

## 2023-01-02 PROCEDURE — 93010 ELECTROCARDIOGRAM REPORT: CPT

## 2023-01-02 PROCEDURE — 72125 CT NECK SPINE W/O DYE: CPT | Mod: 26,MA

## 2023-01-02 PROCEDURE — 71260 CT THORAX DX C+: CPT | Mod: 26,MA

## 2023-01-02 PROCEDURE — 74177 CT ABD & PELVIS W/CONTRAST: CPT | Mod: 26,MA

## 2023-01-02 PROCEDURE — 70450 CT HEAD/BRAIN W/O DYE: CPT | Mod: 26,MA

## 2023-01-02 RX ORDER — TETANUS TOXOID, REDUCED DIPHTHERIA TOXOID AND ACELLULAR PERTUSSIS VACCINE, ADSORBED 5; 2.5; 8; 8; 2.5 [IU]/.5ML; [IU]/.5ML; UG/.5ML; UG/.5ML; UG/.5ML
0.5 SUSPENSION INTRAMUSCULAR ONCE
Refills: 0 | Status: COMPLETED | OUTPATIENT
Start: 2023-01-02 | End: 2023-01-02

## 2023-01-02 RX ORDER — SODIUM CHLORIDE 9 MG/ML
1000 INJECTION, SOLUTION INTRAVENOUS ONCE
Refills: 0 | Status: COMPLETED | OUTPATIENT
Start: 2023-01-02 | End: 2023-01-02

## 2023-01-02 RX ADMIN — TETANUS TOXOID, REDUCED DIPHTHERIA TOXOID AND ACELLULAR PERTUSSIS VACCINE, ADSORBED 0.5 MILLILITER(S): 5; 2.5; 8; 8; 2.5 SUSPENSION INTRAMUSCULAR at 02:25

## 2023-01-02 RX ADMIN — SODIUM CHLORIDE 1000 MILLILITER(S): 9 INJECTION, SOLUTION INTRAVENOUS at 02:05

## 2023-01-02 RX ADMIN — SODIUM CHLORIDE 1000 MILLILITER(S): 9 INJECTION, SOLUTION INTRAVENOUS at 07:29

## 2023-03-31 NOTE — ED ADULT TRIAGE NOTE - ARRIVAL FROM
Room EP 2    Chief Complaint   Patient presents with    Irregular Heart Beat     AFib ablation scheduled for 4/12/23     Vitals:    03/31/23 1026 03/31/23 1040   BP: (!) 92/50 94/60  Comment: BP rechecked   BP 1 Location: Left upper arm Right upper arm   BP Patient Position: Sitting Sitting   BP Cuff Size: Adult Adult   Pulse: 64    Resp: 14    Height: 5' 5\" (1.651 m)    Weight: 121 lb 9.6 oz (55.2 kg)    SpO2: 97%          Chest pain: no    Shortness of breath: no    Edema: no    Palpitations, Skipped beats, Rapid heartbeat: episode on Tuesday felt heart \"flutter\"    Dizziness: no    Fatigue:no    New diagnosis/Surgeries: no    1. Have you been to the ER, urgent care clinic since your last visit? Hospitalized since your last visit? NO      2. Have you seen or consulted any other health care providers outside of the [address] [place] [address] since your last visit? Include any pap smears or colon screening.  NO     Refills: NO Home

## 2023-04-19 PROBLEM — Z00.00 ENCOUNTER FOR PREVENTIVE HEALTH EXAMINATION: Status: ACTIVE | Noted: 2023-04-19

## 2023-05-22 ENCOUNTER — APPOINTMENT (OUTPATIENT)
Dept: UROLOGY | Facility: CLINIC | Age: 63
End: 2023-05-22

## 2023-09-19 ENCOUNTER — APPOINTMENT (OUTPATIENT)
Dept: ORTHOPEDIC SURGERY | Facility: CLINIC | Age: 63
End: 2023-09-19
Payer: OTHER MISCELLANEOUS

## 2023-09-19 DIAGNOSIS — S63.652A SPRAIN OF METACARPOPHALANGEAL JOINT OF RIGHT MIDDLE FINGER, INITIAL ENCOUNTER: ICD-10-CM

## 2023-09-19 PROCEDURE — 73130 X-RAY EXAM OF HAND: CPT | Mod: RT

## 2023-09-19 PROCEDURE — 99204 OFFICE O/P NEW MOD 45 MIN: CPT

## 2023-09-22 PROBLEM — S63.652A SPRAIN OF METACARPOPHALANGEAL (MCP) JOINT OF RIGHT MIDDLE FINGER, INITIAL ENCOUNTER: Status: ACTIVE | Noted: 2023-09-22

## 2023-11-14 ENCOUNTER — APPOINTMENT (OUTPATIENT)
Dept: ORTHOPEDIC SURGERY | Facility: CLINIC | Age: 63
End: 2023-11-14

## 2024-02-21 ENCOUNTER — EMERGENCY (EMERGENCY)
Facility: HOSPITAL | Age: 64
LOS: 0 days | Discharge: HOME | End: 2024-02-21
Attending: EMERGENCY MEDICINE
Payer: COMMERCIAL

## 2024-02-21 VITALS
DIASTOLIC BLOOD PRESSURE: 92 MMHG | TEMPERATURE: 97 F | HEIGHT: 66 IN | WEIGHT: 176.37 LBS | HEART RATE: 89 BPM | SYSTOLIC BLOOD PRESSURE: 175 MMHG | RESPIRATION RATE: 16 BRPM | OXYGEN SATURATION: 99 %
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
## 2024-02-21 DIAGNOSIS — S09.90XA UNSPECIFIED INJURY OF HEAD, INITIAL ENCOUNTER: ICD-10-CM
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
## 2024-02-21 DIAGNOSIS — S00.83XA CONTUSION OF OTHER PART OF HEAD, INITIAL ENCOUNTER: ICD-10-CM

## 2024-02-21 DIAGNOSIS — Z87.891 PERSONAL HISTORY OF NICOTINE DEPENDENCE: ICD-10-CM

## 2024-02-21 DIAGNOSIS — E11.9 TYPE 2 DIABETES MELLITUS WITHOUT COMPLICATIONS: ICD-10-CM

## 2024-02-21 PROCEDURE — 99284 EMERGENCY DEPT VISIT MOD MDM: CPT | Mod: 25

## 2024-02-21 PROCEDURE — 70450 CT HEAD/BRAIN W/O DYE: CPT | Mod: 26,MA

## 2024-02-21 PROCEDURE — 70450 CT HEAD/BRAIN W/O DYE: CPT | Mod: MA

## 2024-02-21 PROCEDURE — 99284 EMERGENCY DEPT VISIT MOD MDM: CPT

## 2024-02-21 NOTE — ED PROVIDER NOTE - ATTENDING APP SHARED VISIT CONTRIBUTION OF CARE
62 yo m with pmh of dm on oral meds, presents with head trauma.  pt says was going down a ladder and missed last step, lost his balance and fell back.  pt hit the back of head on a metal rack.  denies loc.  denies neck pain.  no asa or ac use.  pt is walking around ed.  exam: nad, ncat, mildly ttp posterior scalp, no abrasion or lac, no significant hematoma, no cspine ttp,  perrl, eomi, mmm, rrr, ctab, abd soft, nt, nd aox3, imp: pt with head trauma, will ct head,

## 2024-02-21 NOTE — ED PROVIDER NOTE - PATIENT PORTAL LINK FT
You can access the FollowMyHealth Patient Portal offered by [facility] by registering at the following website: [email or web address]. By joining [facility]’s FollowMyHealth portal, you will also be able to view your health information using other applications (apps) compatible with our system.

## 2024-02-21 NOTE — ED ADULT NURSE NOTE - OBJECTIVE STATEMENT
Patient in NAD a+ox3 sp mechanical fall off first step of ladder, hit head on stool- denies LOC/AC use

## 2024-02-21 NOTE — ED PROVIDER NOTE - CLINICAL SUMMARY MEDICAL DECISION MAKING FREE TEXT BOX
Pt with [facility], reassuring imaging.  pt to f/u with pmd outpt.  Pt was given strict return to ED precautions and pt indicated that he/she understood.

## 2024-03-15 ENCOUNTER — APPOINTMENT (OUTPATIENT)
Dept: NEUROLOGY | Facility: CLINIC | Age: 64
End: 2024-03-15
Payer: OTHER MISCELLANEOUS

## 2024-03-15 VITALS — HEART RATE: 77 BPM | DIASTOLIC BLOOD PRESSURE: 84 MMHG | SYSTOLIC BLOOD PRESSURE: 154 MMHG

## 2024-03-15 PROCEDURE — 99204 OFFICE O/P NEW MOD 45 MIN: CPT

## 2024-03-15 RX ORDER — DICLOFENAC SODIUM 100 MG/1
100 TABLET, FILM COATED, EXTENDED RELEASE ORAL
Qty: 30 | Refills: 2 | Status: ACTIVE | COMMUNITY
Start: 2024-03-15 | End: 1900-01-01

## 2024-04-15 ENCOUNTER — APPOINTMENT (OUTPATIENT)
Dept: NEUROLOGY | Facility: CLINIC | Age: 64
End: 2024-04-15
Payer: OTHER MISCELLANEOUS

## 2024-04-15 VITALS — WEIGHT: 176 LBS | HEIGHT: 66 IN | BODY MASS INDEX: 28.28 KG/M2

## 2024-04-15 VITALS — HEART RATE: 68 BPM | DIASTOLIC BLOOD PRESSURE: 79 MMHG | SYSTOLIC BLOOD PRESSURE: 137 MMHG

## 2024-04-15 DIAGNOSIS — S09.90XA UNSPECIFIED INJURY OF HEAD, INITIAL ENCOUNTER: ICD-10-CM

## 2024-04-15 DIAGNOSIS — M54.81 OCCIPITAL NEURALGIA: ICD-10-CM

## 2024-04-15 DIAGNOSIS — M50.20 OTHER CERVICAL DISC DISPLACEMENT, UNSPECIFIED CERVICAL REGION: ICD-10-CM

## 2024-04-15 PROCEDURE — 99214 OFFICE O/P EST MOD 30 MIN: CPT | Mod: ACP

## 2024-04-15 NOTE — PHYSICAL EXAM
[General Appearance - Alert] : alert [Oriented To Time, Place, And Person] : oriented to person, place, and time [Person] : oriented to person [Place] : oriented to place [Time] : oriented to time [Concentration Intact] : normal concentrating ability [Visual Intact] : visual attention was ~T not ~L decreased [Naming Objects] : no difficulty naming common objects [Repeating Phrases] : no difficulty repeating a phrase [Writing A Sentence] : no difficulty writing a sentence [Fluency] : fluency intact [Comprehension] : comprehension intact [Reading] : reading intact [Past History] : adequate knowledge of personal past history [Cranial Nerves Optic (II)] : visual acuity intact bilaterally,  visual fields full to confrontation, pupils equal round and reactive to light [Cranial Nerves Oculomotor (III)] : extraocular motion intact [Cranial Nerves Trigeminal (V)] : facial sensation intact symmetrically [Cranial Nerves Facial (VII)] : face symmetrical [Cranial Nerves Vestibulocochlear (VIII)] : hearing was intact bilaterally [Cranial Nerves Glossopharyngeal (IX)] : tongue and palate midline [Cranial Nerves Accessory (XI - Cranial And Spinal)] : head turning and shoulder shrug symmetric [Cranial Nerves Hypoglossal (XII)] : there was no tongue deviation with protrusion [Motor Tone] : muscle tone was normal in all four extremities [Motor Strength] : muscle strength was normal in all four extremities [No Muscle Atrophy] : normal bulk in all four extremities [Motor Strength Upper Extremities Left] : there was weakness of the left upper extremity [Sensation Tactile Decrease] : light touch was intact [Balance] : balance was intact [2+] : Ankle jerk left 2+ [PERRL With Normal Accommodation] : pupils were equal in size, round, reactive to light, with normal accommodation [Extraocular Movements] : extraocular movements were intact [Abnormal Walk] : normal gait [Involuntary Movements] : no involuntary movements were seen [Impaired Insight] : insight and judgment were intact [Motor Strength Lower Extremities Bilaterally] : strength was normal in both lower extremities [Motor Strength Upper Extremities Right] : strength was normal in the right upper extremity [Past-pointing] : there was no past-pointing [Tremor] : no tremor present [Coordination - Dysmetria Impaired Finger-to-Nose Bilateral] : not present [Plantar Reflex Right Only] : normal on the right [Plantar Reflex Left Only] : normal on the left [FreeTextEntry6] : LUE 4-/5 with shoulder adduction  / abduction and internal rotation  Full cervical ROM  5/5 to b/l LE  [FreeTextEntry1] : Tenderness to occiput of head. No tenderness to palpation of cervical spine. [Neck Appearance] : the appearance of the neck was normal

## 2024-04-15 NOTE — ASSESSMENT
[FreeTextEntry1] : 63 year old male with occipital neuralgia following a work related injury DOI 2.21.24 during which he stepped off a ladder, slipped on wet floor and hit the back of his head on a metal rack +HT - LOC. Today we reviewed his MRI of the Cervical spine noted above. Patient adamantly denies any neck pain,  radiation of pain or new weakness to his upper extremities following the injury. He does exhibit left upper extremity weakness with shoulder abduction and adduction on physical exam. Today we discussed getting an MRI of the Brain and have I provided referral for him to be evaluated by Neurosurgery who can also  review his MRI report and further assess the extent of "cord impingement" noted at the levels of C5-6 and C6-7 prior to him starting any form of physical therapy. He will return to the office for follow up once the above testing has been completed for review.   Supervising Physician : Pranav Dodson MD

## 2024-04-15 NOTE — HISTORY OF PRESENT ILLNESS
[FreeTextEntry1] : Today I had the pleasure of seeing Mr. Mcclain in our office for follow up. Patient's prior history and imaging have been reviewed.   Patient is under our care for occipital neuralgia following a  work related injury DOI 2.21.24 when patient stepped off a ladder, slipped on a wet floor causing him to hit the back of his head on a metal rack without +HT and -LOC. . Following the injury patient reports developing a "sharp shooting"  pain to the back of his head predominantly the occipital region which he states has gradually decreased in intensity since the injury and he now rates a 5/10. Patient states his pain occurs intermittently and was prescribed Diclofenac by Dr. Dodson at his last visit but did not take it. Patient denies headache, vision changes, dizziness, nausea, vomiting, syncope or other new or progressive neurological symptoms.  Today we discussed getting an MRI of the Brain to rule out traumatic injury.  As part of his initial workup, MRI of the Cervical spine was ordered, which we reviewed today. MRI of the Cervical Spine revealed : C2-3 disc bulge indenting the thecal sac. C3-4 disc bulge indenting the thecal sac. C4-5 disc bulge. C5-6 Broad based central posterior disc herniation superimposed on a disc bulge causes spinal canal stenosis, cord impingement and foraminal narrowing, abutting the bilateral exiting C6 nerve roots without myelomalacia. C6-7 Grade 1 retrolisthesis and broad based central posterior disc herniation with annular tear and superior extrusion causing spinal canal stenosis, cord impingement and foraminal narrowing, abutting the b/l exiting C7 roots without myelomalacia. C7-T1 disc bulge indenting the thecal sac. Straightening of the normal lordosis which may be secondary to spasm. Today patient adamantly denies any neck pain, radiation of pain from the back of his head to his neck or shoulders, denies motor  weakness or changes in sensation to his upper extremities or hands. However, on physical exam, he does exhibit left upper extremity weakness with shoulder abduction and adduction proximally. I will provide a  referral  for him to be seen by Neurosurgery to review his MRI of the Cervical spine and assess the extent of "cord impingement"  noted at the levels of C5-6 and C6-7 prior to him beginning any therapy. Patient states that his symptoms are currently "tolerable" and does not wish for any medication to manage his symptoms at this time.

## 2024-04-15 NOTE — HISTORY OF PRESENT ILLNESS
[FreeTextEntry1] : Patient is a 63-year-old male who presents today in neurologic consultation for history of headaches. Specifically, he states he gets electric shock like sensations in the occipital region. They last for 1-2 minutes and repeat 4-5x a day. This started on 2/21/24 when the patient had an injury at work in which he slipped on the wet floor and struck the back of his head on a metal rack. CT scan of the head was negative. Patient has diabetes.

## 2024-04-15 NOTE — ASSESSMENT
[FreeTextEntry1] : Impression is that of greater occipital neuralgia. He was started on 100 mg of diclofenac ER qd. I ordered an MRI of the cervical spine to visualize the occipital cervical junction. We will see him back in follow up in 1 month for reevaluation. He is not fit for full duty pending MRI of the brain.   Total clinician time spent today on the patient is 45 minutes including preparing to see the patient, obtaining and/or reviewing and confirming history, performing medically necessary and appropriate examination, counseling and educating the patient and/or family, documenting clinical information in the EHR and communicating and/or referring to other healthcare professionals.   Entered by Nicole Laguerre acting as scribe for Dr. Dodson.   The documentation recorded by the scribe, in my presence, accurately reflects the service I personally performed, and the decisions made by me with my edits as appropriate. Pranav Dodson MD, FAAN, FACP Diplomate [employer] Board of Psychiatry & Neurology.

## 2024-04-24 NOTE — ED PROVIDER NOTE - OBJECTIVE STATEMENT
160.02
63-year-old male with past medical history of diabetes presenting for evaluation after hitting the back of his head on a metal object.  No LOC.  Has some pain at the back of his head.  No neck pain.  No dizziness.  No blood thinners.

## 2024-05-07 ENCOUNTER — APPOINTMENT (OUTPATIENT)
Dept: NEUROSURGERY | Facility: CLINIC | Age: 64
End: 2024-05-07
Payer: OTHER MISCELLANEOUS

## 2024-05-07 VITALS — WEIGHT: 176 LBS | HEIGHT: 66 IN | BODY MASS INDEX: 28.28 KG/M2

## 2024-05-07 DIAGNOSIS — Z83.49 FAMILY HISTORY OF OTHER ENDOCRINE, NUTRITIONAL AND METABOLIC DISEASES: ICD-10-CM

## 2024-05-07 DIAGNOSIS — Z83.3 FAMILY HISTORY OF DIABETES MELLITUS: ICD-10-CM

## 2024-05-07 DIAGNOSIS — Z78.9 OTHER SPECIFIED HEALTH STATUS: ICD-10-CM

## 2024-05-07 DIAGNOSIS — I10 ESSENTIAL (PRIMARY) HYPERTENSION: ICD-10-CM

## 2024-05-07 DIAGNOSIS — E11.9 TYPE 2 DIABETES MELLITUS W/OUT COMPLICATIONS: ICD-10-CM

## 2024-05-07 DIAGNOSIS — M54.12 RADICULOPATHY, CERVICAL REGION: ICD-10-CM

## 2024-05-07 PROCEDURE — 99203 OFFICE O/P NEW LOW 30 MIN: CPT

## 2024-05-07 RX ORDER — METFORMIN HYDROCHLORIDE 1000 MG/1
1000 TABLET, COATED ORAL
Refills: 0 | Status: ACTIVE | COMMUNITY

## 2024-05-07 RX ORDER — ATORVASTATIN CALCIUM 10 MG/1
10 TABLET, FILM COATED ORAL
Refills: 0 | Status: ACTIVE | COMMUNITY

## 2024-05-14 NOTE — HISTORY OF PRESENT ILLNESS
[de-identified] : Mr Mcclain presents to discuss his axial cervical neck pain with C5-6 distribution radiculopathy after a work-related injury.  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  He is currently doing physical therapy and pain management.  He wishes to return to work.  Although from a radiology perspective his C5-6 disc herniation meets possible criteria for surgical removal, he is doing well from a clinical perspective and therefore he should return to he can return to work as needed.  Will have him return back to clinic in several weeks and see how he is doing, he be candidate for C5-6 ACDF pending his response to medical management physical therapy.

## 2024-05-14 NOTE — PHYSICAL EXAM
[Oriented To Time, Place, And Person] : oriented to person, place, and time [Motor Tone] : muscle tone was normal in all four extremities [Motor Strength] : muscle strength was normal in all four extremities [Motor Handedness Right-Handed] : the patient is right hand dominant [Sensation Tactile Decrease] : light touch was intact [Sensation Pain / Temperature Decrease] : pain and temperature was intact [2+] : Brachioradialis left 2+

## 2024-07-16 ENCOUNTER — APPOINTMENT (OUTPATIENT)
Dept: NEUROSURGERY | Facility: CLINIC | Age: 64
End: 2024-07-16
Payer: OTHER MISCELLANEOUS

## 2024-07-16 VITALS — WEIGHT: 176 LBS | HEIGHT: 66 IN | BODY MASS INDEX: 28.28 KG/M2

## 2024-07-16 DIAGNOSIS — M54.12 RADICULOPATHY, CERVICAL REGION: ICD-10-CM

## 2024-07-16 PROCEDURE — 99213 OFFICE O/P EST LOW 20 MIN: CPT

## 2025-03-06 ENCOUNTER — NON-APPOINTMENT (OUTPATIENT)
Age: 65
End: 2025-03-06

## 2025-03-06 ENCOUNTER — APPOINTMENT (OUTPATIENT)
Dept: NEUROSURGERY | Facility: CLINIC | Age: 65
End: 2025-03-06
Payer: OTHER MISCELLANEOUS

## 2025-03-06 VITALS — BODY MASS INDEX: 28.28 KG/M2 | WEIGHT: 176 LBS | HEIGHT: 66 IN

## 2025-03-06 DIAGNOSIS — M54.12 RADICULOPATHY, CERVICAL REGION: ICD-10-CM

## 2025-03-06 PROCEDURE — 99213 OFFICE O/P EST LOW 20 MIN: CPT

## 2025-04-15 ENCOUNTER — APPOINTMENT (OUTPATIENT)
Dept: NEUROSURGERY | Facility: CLINIC | Age: 65
End: 2025-04-15